# Patient Record
Sex: MALE | URBAN - METROPOLITAN AREA
[De-identification: names, ages, dates, MRNs, and addresses within clinical notes are randomized per-mention and may not be internally consistent; named-entity substitution may affect disease eponyms.]

---

## 2023-04-19 ENCOUNTER — OFFICE VISIT (OUTPATIENT)
Dept: URBAN - METROPOLITAN AREA CLINIC 10 | Facility: CLINIC | Age: 63
End: 2023-04-19
Payer: COMMERCIAL

## 2023-04-19 DIAGNOSIS — H25.813 COMBINED FORMS OF AGE-RELATED CATARACT, BILATERAL: ICD-10-CM

## 2023-04-19 DIAGNOSIS — Z98.890 OTHER SPECIFIED POSTPROCEDURAL STATES: ICD-10-CM

## 2023-04-19 DIAGNOSIS — H18.713 CORNEAL ECTASIA, BILATERAL: Primary | ICD-10-CM

## 2023-04-19 PROCEDURE — 99204 OFFICE O/P NEW MOD 45 MIN: CPT | Performed by: OPHTHALMOLOGY

## 2023-04-19 ASSESSMENT — KERATOMETRY
OD: 45.50
OS: 40.13

## 2023-04-19 ASSESSMENT — INTRAOCULAR PRESSURE
OS: 13
OD: 13

## 2023-04-19 ASSESSMENT — VISUAL ACUITY
OD: 20/150
OS: 20/20

## 2023-04-19 NOTE — IMPRESSION/PLAN
Impression: Combined forms of age-related cataract, bilateral: H25.813.  Plan: Visually significant, will address in the future

## 2023-04-19 NOTE — IMPRESSION/PLAN
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
- h/o LASIK OU Plan: Explained to the patient the progressive nature of the disease and can significantly reduce a patient's visual acuity and visual quality. First line treatments include hard contact lenses, scleral contact lenses and intracorneal ring segments. These treatments address signs and symptoms but do not halt progression of the disease. If the disease progresses, it can lead to corneal blindness and may require corneal transplantation for visual recovery which is invasive, has a prolonged recovery period, and carries a lifelong risk of tissue rejection, graft failure, post-surgical medications and other complications. Corneal crosslinking (CXL) is the only FDA-approved treatment modality that has been shown to slow or halt the progression of keratoconus. This may prevent the need for future corneal transplantation. Early treatment of keratoconus with CXL is medically necessary and indicated. The pt understands and would like to proceed with surgery. Schedule for CXL OU
**** Will obtain notes from outside optometrist to determine if progression has occurred , and if so will proceed with CXL at that time.   If not, will proceed with cat sx